# Patient Record
Sex: FEMALE | Race: BLACK OR AFRICAN AMERICAN | NOT HISPANIC OR LATINO | Employment: PART TIME | URBAN - METROPOLITAN AREA
[De-identification: names, ages, dates, MRNs, and addresses within clinical notes are randomized per-mention and may not be internally consistent; named-entity substitution may affect disease eponyms.]

---

## 2019-05-13 ENCOUNTER — TELEPHONE (OUTPATIENT)
Dept: MATERNAL FETAL MEDICINE | Facility: CLINIC | Age: 39
End: 2019-05-13

## 2019-05-13 DIAGNOSIS — Z36.89 ENCOUNTER FOR FETAL ANATOMIC SURVEY: Primary | ICD-10-CM

## 2019-05-13 NOTE — TELEPHONE ENCOUNTER
RN attempted to leave a message for the patient to call Abby at [phone] at her earliest convenience to schedule her ultrasound and consult. No voicemail available to leave message.

## 2019-06-05 ENCOUNTER — OFFICE VISIT (OUTPATIENT)
Dept: MATERNAL FETAL MEDICINE | Facility: CLINIC | Age: 39
End: 2019-06-05
Payer: OTHER GOVERNMENT

## 2019-06-05 DIAGNOSIS — O09.522 ELDERLY MULTIGRAVIDA IN SECOND TRIMESTER: ICD-10-CM

## 2019-06-05 DIAGNOSIS — Z36.89 ENCOUNTER FOR ULTRASOUND TO CHECK FETAL GROWTH: Primary | ICD-10-CM

## 2019-06-05 DIAGNOSIS — Z36.89 ENCOUNTER FOR FETAL ANATOMIC SURVEY: ICD-10-CM

## 2019-06-05 PROCEDURE — 76811 PR US, OB FETAL EVAL & EXAM, TRANSABDOM,FIRST GESTATION: ICD-10-PCS | Mod: ,,, | Performed by: OBSTETRICS & GYNECOLOGY

## 2019-06-05 PROCEDURE — 99202 OFFICE O/P NEW SF 15 MIN: CPT | Mod: 25,,, | Performed by: OBSTETRICS & GYNECOLOGY

## 2019-06-05 PROCEDURE — 76811 OB US DETAILED SNGL FETUS: CPT | Mod: ,,, | Performed by: OBSTETRICS & GYNECOLOGY

## 2019-06-05 PROCEDURE — 99202 PR OFFICE/OUTPT VISIT, NEW, LEVL II, 15-29 MIN: ICD-10-PCS | Mod: 25,,, | Performed by: OBSTETRICS & GYNECOLOGY

## 2019-06-05 NOTE — PROGRESS NOTES
Chief complaint: AMA    Provider requesting consultation: Dr. Engel-SRAVANI    38 y.o. [id number] 21w2d EGA    PMH:  Past Medical History:   Diagnosis Date    AMA (advanced maternal age) multigravida 35+     Ovarian cyst        PObHx:  OB History    Para Term  AB Living   3 1 1   1 1   SAB TAB Ectopic Multiple Live Births   1       1      # Outcome Date GA Lbr Lukas/2nd Weight Sex Delivery Anes PTL Lv   3 Current            2 Term 16 39w0d  2.863 kg (6 lb 5 oz) M CS-Unspec   MORENA   1 SAB  10w0d              PSH:  Past Surgical History:   Procedure Laterality Date    BREAST LUMPECTOMY       SECTION         Family history:family history is not on file.    Social history: reports that she has never smoked. She has never used smokeless tobacco.    A detailed fetal anatomical ultrasound was completed today.  See details in imaging section of EPIC.    Age based risk for Down syndrome at this gestational age is approximately 1 in 150  Aneuploidy screening this pregnancy estimates the risk of Down syndrome to be 1 in 10,000        Today the patient was counseled on the relationship between maternal age and genetic aneuploidy.  The patient was counseled on the risks and benefits of screening tests versus definitive genetic testing (amniocentesis). Patient was counseled about her specific age related risk of Down Syndrome. We discussed the limitations of ultrasound in the definitive diagnosis of Down Syndrome and we discussed amniocentesis as providing definitive diagnosis.  I quoted the patient a 1 in 1000 procedure related risk of fetal loss with genetic amniocentesis. After today's consultation she  did not want to pursue genetic amniocentesis.    We also discussed non-invasive prenatal diagnosis(NIPD) for trisomy 21, 18 and 13 through free fetal DNA in maternal serum. Non-invasive prenatal diagnosis poses no fetal risks and is done through an in office blood draw. The majority of circulating  cell-free fetal DNA in maternal serum comes from placental cells, therefore a small risk, similar to that seen for CVS results, exists for obtaining a result that may represent confined placental mosaicism.  Non-invasive prenatal diagnosis is available from 10 weeks gestation throughout the remainder of the pregnancy. Typically results are available within 8-10 days and are 99% sensitive and specific for trisomy 21 and trisomy 18. Results are 91 % sensitive and >99% specific for trisomy 13. There is no detailed information about the structure of chromosomes 21, 18 or 13, only the relative copy number. Follow-up diagnostic testing through CVS or amniocentesis is recommended for any abnormal result.      -We also reviewed that AMA is associated with an increased risk of adverse pregnancy outcomes such as miscarriage, ectopic pregnancy, diabetes, hypertension, and other adverse outcomes. There is also an increased risk of stillibirth, particularly in women age 40 and above.    -[facility] recommends the following for women 35 and older at their CHELSEY:   -Detailed fetal anatomic survey at 19 to 20 weeks gestation   -Ultrasound for fetal growth at 32 to 34 weeks gestation     The patient was given an opportunity to ask questions about management and the diease process.  She expressed an understanding of and agreement to the above impression and plan. All questions were answered to her satisfaction.  She was given contact information to the [facility] clinic to address further concerns.      The approximate physician face-to-face time was 15 minutes. The majority of the time (>50%) was spent on counseling of the patient or coordination of care.

## 2019-06-05 NOTE — LETTER
June 5, 2019      Radha Engel MD  [facility]  [address] [facility]  [place] [address]           [place] [facility]  [facility] , [address]  [place] [address]  Phone: [phone]          Patient: Susanne Barnhart   MR Number: [id number]   YOB: 1980   Date of Visit: 6/5/2019       Dear Dr. Radha Engel:    Thank you for referring Susanne Barnhart to me for evaluation. Attached you will find relevant portions of my assessment and plan of care.    If you have questions, please do not hesitate to call me. I look forward to following Susanne Barnhart along with you.    Sincerely,    Nicholas Kiser MD    Enclosure  CC:  No Recipients    If you would like to receive this communication electronically, please contact [email or web address] or [phone] to request more information on [employer] access.    For providers and/or their staff who would like to refer a patient to [facility], please contact us through our one-stop-shop provider referral line, [facility] [place], at [phone].    If you feel you have received this communication in error or would no longer like to receive these types of communications, please e-mail [email or web address]

## 2019-08-26 ENCOUNTER — PROCEDURE VISIT (OUTPATIENT)
Dept: MATERNAL FETAL MEDICINE | Facility: CLINIC | Age: 39
End: 2019-08-26
Payer: OTHER GOVERNMENT

## 2019-08-26 VITALS — DIASTOLIC BLOOD PRESSURE: 70 MMHG | WEIGHT: 207.81 LBS | SYSTOLIC BLOOD PRESSURE: 100 MMHG

## 2019-08-26 DIAGNOSIS — Z36.89 ENCOUNTER FOR ULTRASOUND TO CHECK FETAL GROWTH: ICD-10-CM

## 2019-08-26 PROCEDURE — 99499 NO LOS: ICD-10-PCS | Mod: ,,, | Performed by: OBSTETRICS & GYNECOLOGY

## 2019-08-26 PROCEDURE — 76816 PR  US,PREGNANT UTERUS,F/U,TRANSABD APP: ICD-10-PCS | Mod: ,,, | Performed by: OBSTETRICS & GYNECOLOGY

## 2019-08-26 PROCEDURE — 99499 UNLISTED E&M SERVICE: CPT | Mod: ,,, | Performed by: OBSTETRICS & GYNECOLOGY

## 2019-08-26 PROCEDURE — 76816 OB US FOLLOW-UP PER FETUS: CPT | Mod: ,,, | Performed by: OBSTETRICS & GYNECOLOGY
